# Patient Record
Sex: MALE | Race: WHITE | Employment: UNEMPLOYED | URBAN - METROPOLITAN AREA
[De-identification: names, ages, dates, MRNs, and addresses within clinical notes are randomized per-mention and may not be internally consistent; named-entity substitution may affect disease eponyms.]

---

## 2020-09-01 ENCOUNTER — VIRTUAL VISIT (OUTPATIENT)
Dept: URGENT CARE | Facility: CLINIC | Age: 30
End: 2020-09-01

## 2020-09-01 DIAGNOSIS — L29.0 ANAL ITCHING: Primary | ICD-10-CM

## 2020-09-01 DIAGNOSIS — R36.9 PENILE DISCHARGE: ICD-10-CM

## 2020-09-01 PROCEDURE — 99203 OFFICE O/P NEW LOW 30 MIN: CPT | Mod: 95

## 2020-09-01 RX ORDER — HYDROCORTISONE 25 MG/G
CREAM TOPICAL 2 TIMES DAILY PRN
Qty: 30 G | Refills: 0 | Status: SHIPPED | OUTPATIENT
Start: 2020-09-01 | End: 2020-09-11

## 2020-09-01 NOTE — PROGRESS NOTES
"  Amari Tong is a 30 year old male who is being evaluated via a billable telephone visit.      The patient has been notified of following:     \"This telephone visit will be conducted via a call between you and your physician/provider. We have found that certain health care needs can be provided without the need for a physical exam.  This service lets us provide the care you need with a short phone conversation.  If a prescription is necessary we can send it directly to your pharmacy.  If lab work is needed we can place an order for that and you can then stop by our lab to have the test done at a later time.    If during the course of the call the physician/provider feels a telephone visit is not appropriate, you will not be charged for this service.\"     Patient has given verbal consent for Telephone visit?  Yes    SUBJECTIVE:  Amari Tong is an 30 year old male who presents for itching on butt.  Has a little blood sometimes when wipes.  No blood mixed in stools.  No black or tarry schools. Hurts sometimes when takes a shower.  Seems like sometimes there is something there he can feel, but other times can't feel it.  sxs are right at rectum. Has had sxs for a couple months.  No n/v.  Has had some diarrhea occasionally but not always, usually depends on what he ate.  No h/o constipation.  Sometimes has hard and large BMs, and more likely to hurt to pass or have blood on TP after that.  Ha some rectal itching.   No fevers.   Has been a little tired.    Also has had some penile itching recently and had a tiny bit of white milky discharge from the penis.  Has some burning with urination.  Is occasionally sexually active, last time was about a month ago, and he cannot be sure he doesn't have an std after that.  No fevers.    PMH:  neg    Social History     Socioeconomic History     Marital status: Single     Spouse name: Not on file     Number of children: Not on file     Years of education: Not on file     " Highest education level: Not on file   Occupational History     Not on file   Social Needs     Financial resource strain: Not on file     Food insecurity     Worry: Not on file     Inability: Not on file     Transportation needs     Medical: Not on file     Non-medical: Not on file   Tobacco Use     Smoking status: Not on file   Substance and Sexual Activity     Alcohol use: Not on file     Drug use: Not on file     Sexual activity: Not on file   Lifestyle     Physical activity     Days per week: Not on file     Minutes per session: Not on file     Stress: Not on file   Relationships     Social connections     Talks on phone: Not on file     Gets together: Not on file     Attends [employer] service: Not on file     Active member of club or organization: Not on file     Attends meetings of clubs or organizations: Not on file     Relationship status: Not on file     Intimate partner violence     Fear of current or ex partner: Not on file     Emotionally abused: Not on file     Physically abused: Not on file     Forced sexual activity: Not on file   Other Topics Concern     Not on file   Social History Narrative     Not on file     FH: neg for colon cancer as far as he knows.    ALLERGIES:  No known allergies    No current outpatient medications on file.     No current facility-administered medications for this visit.          ROS:  ROS is done and is negative for general/constitutional, eye, ENT, Respiratory, cardiovascular, GI, , Skin, musculoskeletal except as noted elsewhere.  All other review of systems negative except as noted elsewhere.      OBJECTIVE:  There were no vitals taken for this visit.  GENERAL : Alert and oriented.  Did not seem to be in distress.   RESP: No respiratory distress detected during phone conversation         ASSESSMENT/PLAN:    ASSESSMENT / PLAN:  (L29.0) Anal itching  (primary encounter diagnosis)  Comment: hx overall is c/w possible hemorrhoids, but without exam cannot r/o other  possible causes such as rectal mass or wart or other etiology  Plan: hydrocortisone, Perianal, (HYDROCORTISONE) 2.5         % cream        Will rx anusol for now, but is advised to have an in person appt within the next week to be examined.  Can be seen by pcp or in [facility].    (R36.9) Penile discharge  Comment: also some itching and dysuria.  Based on sxs, needs eval for stdsa   Plan: advised to be seen in person either in [facility] or with pcp for further eval of his sxs and likely std testing and/or tx.  Advised to be seen today or tomorrow. Pt agrees with plan.          See [facility] for orders, medications, letters, patient instructions    Raya Quiñonez MD  9/1/2020, 5:28 PM      Phone call duration:  15 minutes